# Patient Record
Sex: FEMALE | Race: WHITE | NOT HISPANIC OR LATINO
[De-identification: names, ages, dates, MRNs, and addresses within clinical notes are randomized per-mention and may not be internally consistent; named-entity substitution may affect disease eponyms.]

---

## 2019-04-03 PROBLEM — Z00.00 ENCOUNTER FOR PREVENTIVE HEALTH EXAMINATION: Status: ACTIVE | Noted: 2019-04-03

## 2019-04-30 ENCOUNTER — APPOINTMENT (OUTPATIENT)
Dept: PULMONOLOGY | Facility: CLINIC | Age: 59
End: 2019-04-30
Payer: COMMERCIAL

## 2019-04-30 VITALS
HEART RATE: 77 BPM | BODY MASS INDEX: 20.02 KG/M2 | HEIGHT: 63 IN | DIASTOLIC BLOOD PRESSURE: 60 MMHG | SYSTOLIC BLOOD PRESSURE: 108 MMHG | WEIGHT: 113 LBS | OXYGEN SATURATION: 100 %

## 2019-04-30 DIAGNOSIS — Z86.39 PERSONAL HISTORY OF OTHER ENDOCRINE, NUTRITIONAL AND METABOLIC DISEASE: ICD-10-CM

## 2019-04-30 DIAGNOSIS — J45.909 UNSPECIFIED ASTHMA, UNCOMPLICATED: ICD-10-CM

## 2019-04-30 DIAGNOSIS — Z82.5 FAMILY HISTORY OF ASTHMA AND OTHER CHRONIC LOWER RESPIRATORY DISEASES: ICD-10-CM

## 2019-04-30 PROCEDURE — 99204 OFFICE O/P NEW MOD 45 MIN: CPT | Mod: 25

## 2019-04-30 PROCEDURE — 94664 DEMO&/EVAL PT USE INHALER: CPT | Mod: 59

## 2019-04-30 PROCEDURE — 94729 DIFFUSING CAPACITY: CPT

## 2019-04-30 PROCEDURE — 94727 GAS DIL/WSHOT DETER LNG VOL: CPT

## 2019-04-30 PROCEDURE — 85018 HEMOGLOBIN: CPT | Mod: QW

## 2019-04-30 PROCEDURE — 94060 EVALUATION OF WHEEZING: CPT

## 2019-04-30 RX ORDER — PNV NO.95/FERROUS FUM/FOLIC AC 28MG-0.8MG
100 TABLET ORAL
Refills: 0 | Status: ACTIVE | COMMUNITY

## 2019-04-30 RX ORDER — RIBOFLAVIN (VITAMIN B2) 100 MG
100 TABLET ORAL
Refills: 0 | Status: ACTIVE | COMMUNITY

## 2019-04-30 RX ORDER — MONTELUKAST SODIUM 10 MG/1
10 TABLET, FILM COATED ORAL
Refills: 0 | Status: ACTIVE | COMMUNITY

## 2019-04-30 RX ORDER — MULTIVIT-MIN/FOLIC/VIT K/LYCOP 400-300MCG
25 MCG TABLET ORAL
Refills: 0 | Status: ACTIVE | COMMUNITY

## 2019-04-30 RX ORDER — ATORVASTATIN CALCIUM 10 MG/1
10 TABLET, FILM COATED ORAL
Refills: 0 | Status: ACTIVE | COMMUNITY

## 2019-04-30 RX ORDER — LEVOTHYROXINE SODIUM 0.03 MG/1
25 TABLET ORAL
Refills: 0 | Status: ACTIVE | COMMUNITY

## 2019-04-30 RX ORDER — CALCIUM CARBONATE/VITAMIN D3 500-10/5ML
30 LIQUID (ML) ORAL
Refills: 0 | Status: ACTIVE | COMMUNITY

## 2019-04-30 NOTE — DISCUSSION/SUMMARY
[FreeTextEntry1] : Asthma by hx\par recurrent bronchitis and fatigue \par never smoker, few childhood pneumonias by hx\par denies any rhinitis, GERD, or significant environmental exposure currently\par current exam without bronchospasm, normal sp02\par PFTs are normal\par discussed CT chest to evaluate for bronchiectasis, she refuses currently\par would continue Singulair and prn rescue\par she states routine screening and labs are "ok:\par pt states she will follow up prn

## 2019-04-30 NOTE — PHYSICAL EXAM
[General Appearance - Well Developed] : well developed [General Appearance - Well Nourished] : well nourished [Normal Conjunctiva] : the conjunctiva exhibited no abnormalities [Normal Oropharynx] : normal oropharynx [II] : II [Neck Appearance] : the appearance of the neck was normal [Heart Rate And Rhythm] : heart rate and rhythm were normal [Heart Sounds] : normal S1 and S2 [Murmurs] : no murmurs present [Edema] : no peripheral edema present [Respiration, Rhythm And Depth] : normal respiratory rhythm and effort [Exaggerated Use Of Accessory Muscles For Inspiration] : no accessory muscle use [Auscultation Breath Sounds / Voice Sounds] : lungs were clear to auscultation bilaterally [Lungs Percussion] : the lungs were normal to percussion [Abnormal Walk] : normal gait [Nail Clubbing] : no clubbing of the fingernails [Cyanosis, Localized] : no localized cyanosis [] : no rash [No Focal Deficits] : no focal deficits [Oriented To Time, Place, And Person] : oriented to person, place, and time [Impaired Insight] : insight and judgment were intact [Affect] : the affect was normal [Mood] : the mood was normal [Memory Recent] : recent memory was not impaired [FreeTextEntry1] : no chest wall abn

## 2019-04-30 NOTE — CONSULT LETTER
[Dear  ___] : Dear  [unfilled], [Consult Letter:] : I had the pleasure of evaluating your patient, [unfilled]. [Please see my note below.] : Please see my note below. [Sincerely,] : Sincerely, [FreeTextEntry3] : Garrick Mustafa DO [facility]\par Pulmonary Critical Care\par Director Pulmonary Division\par Medical Director Respiratory Therapy\par [facility]\par \par

## 2019-04-30 NOTE — HISTORY OF PRESENT ILLNESS
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

## 2019-05-14 ENCOUNTER — OUTPATIENT (OUTPATIENT)
Dept: OUTPATIENT SERVICES | Facility: HOSPITAL | Age: 59
LOS: 1 days | End: 2019-05-14
Payer: COMMERCIAL

## 2019-05-14 DIAGNOSIS — R53.83 OTHER FATIGUE: ICD-10-CM

## 2019-05-14 PROCEDURE — 93016 CV STRESS TEST SUPVJ ONLY: CPT

## 2019-05-14 PROCEDURE — 93017 CV STRESS TEST TRACING ONLY: CPT

## 2019-05-14 PROCEDURE — 93018 CV STRESS TEST I&R ONLY: CPT

## 2022-11-10 ENCOUNTER — OFFICE (OUTPATIENT)
Dept: URBAN - METROPOLITAN AREA CLINIC 63 | Facility: CLINIC | Age: 62
Setting detail: OPHTHALMOLOGY
End: 2022-11-10
Payer: MEDICARE

## 2022-11-10 DIAGNOSIS — H43.813: ICD-10-CM

## 2022-11-10 DIAGNOSIS — H52.31: ICD-10-CM

## 2022-11-10 DIAGNOSIS — H52.13: ICD-10-CM

## 2022-11-10 DIAGNOSIS — H25.13: ICD-10-CM

## 2022-11-10 DIAGNOSIS — H35.362: ICD-10-CM

## 2022-11-10 PROCEDURE — 92004 COMPRE OPH EXAM NEW PT 1/>: CPT | Performed by: STUDENT IN AN ORGANIZED HEALTH CARE EDUCATION/TRAINING PROGRAM

## 2022-11-10 PROCEDURE — 92134 CPTRZ OPH DX IMG PST SGM RTA: CPT | Performed by: STUDENT IN AN ORGANIZED HEALTH CARE EDUCATION/TRAINING PROGRAM

## 2022-11-10 ASSESSMENT — TONOMETRY
OS_IOP_MMHG: 14
OD_IOP_MMHG: 15

## 2022-11-10 ASSESSMENT — REFRACTION_AUTOREFRACTION
OS_SPHERE: -3.75
OS_AXIS: 103
OS_CYLINDER: -0.50
OD_AXIS: 020
OD_CYLINDER: -1.50
OD_SPHERE: +0.50

## 2022-11-10 ASSESSMENT — CONFRONTATIONAL VISUAL FIELD TEST (CVF)
OD_FINDINGS: FULL
OS_FINDINGS: FULL

## 2022-11-10 ASSESSMENT — SPHEQUIV_DERIVED
OD_SPHEQUIV: -0.25
OS_SPHEQUIV: -4

## 2022-11-10 ASSESSMENT — AXIALLENGTH_DERIVED
OD_AL: 24.9065
OS_AL: 27.8744

## 2022-11-10 ASSESSMENT — KERATOMETRY
OS_K1POWER_DIOPTERS: 37.75
OD_K1POWER_DIOPTERS: 40.00
OD_AXISANGLE_DEGREES: 104
OS_AXISANGLE_DEGREES: 014
OS_K2POWER_DIOPTERS: 38.00
OD_K2POWER_DIOPTERS: 40.75

## 2022-11-10 ASSESSMENT — VISUAL ACUITY
OD_BCVA: 20/50
OS_BCVA: 20/25

## 2023-01-12 ENCOUNTER — OFFICE (OUTPATIENT)
Dept: URBAN - METROPOLITAN AREA CLINIC 104 | Facility: CLINIC | Age: 63
Setting detail: OPHTHALMOLOGY
End: 2023-01-12
Payer: MEDICARE

## 2023-01-12 DIAGNOSIS — H52.31: ICD-10-CM

## 2023-01-12 DIAGNOSIS — H25.13: ICD-10-CM

## 2023-01-12 DIAGNOSIS — H52.13: ICD-10-CM

## 2023-01-12 DIAGNOSIS — H35.362: ICD-10-CM

## 2023-01-12 DIAGNOSIS — H43.813: ICD-10-CM

## 2023-01-12 DIAGNOSIS — H35.40: ICD-10-CM

## 2023-01-12 PROCEDURE — 92015 DETERMINE REFRACTIVE STATE: CPT | Performed by: OPHTHALMOLOGY

## 2023-01-12 PROCEDURE — 92014 COMPRE OPH EXAM EST PT 1/>: CPT | Performed by: OPHTHALMOLOGY

## 2023-01-12 ASSESSMENT — KERATOMETRY
OD_K2POWER_DIOPTERS: 41.06
OS_CYLAXISANGLE_DEGREES: 9
OS_K2POWER_DIOPTERS: 38.22
OS_CYLPOWER_DEGREES: 0.3
OD_AXISANGLE_DEGREES: 108
OD_CYLPOWER_DEGREES: 0.98
OD_CYLAXISANGLE_DEGREES: 108
OS_AXISANGLE_DEGREES: 9
OS_K1POWER_DIOPTERS: 37.92
OD_K1K2_AVERAGE: 40.57
OS_K1K2_AVERAGE: 38.07
OS_AXISANGLE2_DEGREES: 9
OD_AXISANGLE2_DEGREES: 108
OD_K1POWER_DIOPTERS: 40.08

## 2023-01-12 ASSESSMENT — CONFRONTATIONAL VISUAL FIELD TEST (CVF)
OS_FINDINGS: FULL
OD_FINDINGS: FULL

## 2023-01-12 ASSESSMENT — TONOMETRY
OD_IOP_MMHG: 15
OS_IOP_MMHG: 15

## 2023-01-16 PROBLEM — H17.9 CORNEAL SCAR ; BOTH EYES: Status: ACTIVE | Noted: 2023-01-12

## 2023-01-16 PROBLEM — H35.40 PERIPAPILLARY ATROPHY ; LEFT EYE: Status: ACTIVE | Noted: 2023-01-12

## 2023-01-17 PROBLEM — H25.11 CATARACT SENILE NUCLEAR SCLEROSIS; RIGHT EYE, LEFT EYE, BOTH EYES: Status: ACTIVE | Noted: 2023-01-12

## 2023-01-17 PROBLEM — H25.13 CATARACT SENILE NUCLEAR SCLEROSIS; RIGHT EYE, LEFT EYE, BOTH EYES: Status: ACTIVE | Noted: 2023-01-12

## 2023-01-17 PROBLEM — H25.12 CATARACT SENILE NUCLEAR SCLEROSIS; RIGHT EYE, LEFT EYE, BOTH EYES: Status: ACTIVE | Noted: 2023-01-12

## 2023-01-17 ASSESSMENT — REFRACTION_AUTOREFRACTION
OS_AXIS: 159
OD_AXIS: 60
OD_SPHERE: -0.50
OS_CYLINDER: -0.75
OD_CYLINDER: -0.75
OS_SPHERE: -3.50

## 2023-01-17 ASSESSMENT — REFRACTION_MANIFEST
OD_VA1: 20/25
OD_CYLINDER: -0.75
OS_SPHERE: -3.25
OS_AXIS: 160
OS_VA1: 20/25
OD_AXIS: 60
OS_CYLINDER: -0.75
OD_SPHERE: -0.50

## 2023-01-17 ASSESSMENT — SPHEQUIV_DERIVED
OS_SPHEQUIV: -3.875
OD_SPHEQUIV: -0.875
OD_SPHEQUIV: -0.875
OS_SPHEQUIV: -3.625

## 2023-01-17 ASSESSMENT — KERATOMETRY
OS_K2POWER_DIOPTERS: 38.22
OD_K1POWER_DIOPTERS: 40.08
OS_K1POWER_DIOPTERS: 37.92
OS_AXISANGLE_DEGREES: 9
OD_AXISANGLE_DEGREES: 108
OD_K2POWER_DIOPTERS: 41.06

## 2023-01-17 ASSESSMENT — AXIALLENGTH_DERIVED
OS_AL: 27.71
OD_AL: 25.1
OS_AL: 27.57
OD_AL: 25.1

## 2023-01-17 ASSESSMENT — VISUAL ACUITY
OD_BCVA: 20/50
OS_BCVA: 20/25

## 2023-05-25 ENCOUNTER — OFFICE (OUTPATIENT)
Dept: URBAN - METROPOLITAN AREA CLINIC 104 | Facility: CLINIC | Age: 63
Setting detail: OPHTHALMOLOGY
End: 2023-05-25
Payer: MEDICARE

## 2023-05-25 DIAGNOSIS — H35.40: ICD-10-CM

## 2023-05-25 DIAGNOSIS — H25.11: ICD-10-CM

## 2023-05-25 DIAGNOSIS — H52.31: ICD-10-CM

## 2023-05-25 DIAGNOSIS — H17.9: ICD-10-CM

## 2023-05-25 DIAGNOSIS — H25.12: ICD-10-CM

## 2023-05-25 DIAGNOSIS — H52.13: ICD-10-CM

## 2023-05-25 DIAGNOSIS — H35.362: ICD-10-CM

## 2023-05-25 DIAGNOSIS — H43.813: ICD-10-CM

## 2023-05-25 DIAGNOSIS — H25.13: ICD-10-CM

## 2023-05-25 PROCEDURE — 99213 OFFICE O/P EST LOW 20 MIN: CPT | Performed by: OPHTHALMOLOGY

## 2023-05-25 ASSESSMENT — REFRACTION_MANIFEST
OD_AXIS: 45
OD_VA1: 20/20-1
OD_SPHERE: PLANO
OS_VA1: 20/20-1
OS_SPHERE: -3.50
OD_CYLINDER: -0.50

## 2023-05-25 ASSESSMENT — REFRACTION_AUTOREFRACTION
OS_CYLINDER: -0.50
OS_SPHERE: -3.75
OD_SPHERE: +0.25
OD_CYLINDER: -0.75
OD_AXIS: 044
OS_AXIS: 165

## 2023-05-25 ASSESSMENT — VISUAL ACUITY
OD_BCVA: 20/50
OS_BCVA: 20/25

## 2023-05-25 ASSESSMENT — TONOMETRY
OS_IOP_MMHG: 12
OD_IOP_MMHG: 12

## 2023-05-25 ASSESSMENT — REFRACTION_CURRENTRX
OD_OVR_VA: 20/
OS_OVR_VA: 20/

## 2023-05-25 ASSESSMENT — SPHEQUIV_DERIVED
OS_SPHEQUIV: -4
OD_SPHEQUIV: -0.125

## 2023-05-25 ASSESSMENT — KERATOMETRY
OS_AXISANGLE_DEGREES: 024
OD_K2POWER_DIOPTERS: 40.91
OS_K2POWER_DIOPTERS: 38.18
OS_K1POWER_DIOPTERS: 38.01
OD_AXISANGLE_DEGREES: 104
OD_K1POWER_DIOPTERS: 40.18

## 2023-05-25 ASSESSMENT — AXIALLENGTH_DERIVED
OD_AL: 24.78
OS_AL: 27.76

## 2023-05-25 ASSESSMENT — CONFRONTATIONAL VISUAL FIELD TEST (CVF)
OD_FINDINGS: FULL
OS_FINDINGS: FULL

## 2024-01-25 ENCOUNTER — OFFICE (OUTPATIENT)
Dept: URBAN - METROPOLITAN AREA CLINIC 104 | Facility: CLINIC | Age: 64
Setting detail: OPHTHALMOLOGY
End: 2024-01-25
Payer: MEDICARE

## 2024-01-25 DIAGNOSIS — H25.11: ICD-10-CM

## 2024-01-25 DIAGNOSIS — H17.9: ICD-10-CM

## 2024-01-25 DIAGNOSIS — H52.13: ICD-10-CM

## 2024-01-25 DIAGNOSIS — H35.40: ICD-10-CM

## 2024-01-25 DIAGNOSIS — H25.13: ICD-10-CM

## 2024-01-25 DIAGNOSIS — H35.362: ICD-10-CM

## 2024-01-25 DIAGNOSIS — H43.813: ICD-10-CM

## 2024-01-25 DIAGNOSIS — H52.31: ICD-10-CM

## 2024-01-25 DIAGNOSIS — H25.12: ICD-10-CM

## 2024-01-25 PROBLEM — H35.54 RPE CHANGES: Status: ACTIVE | Noted: 2024-01-25

## 2024-01-25 PROCEDURE — 92134 CPTRZ OPH DX IMG PST SGM RTA: CPT | Performed by: OPHTHALMOLOGY

## 2024-01-25 PROCEDURE — 92014 COMPRE OPH EXAM EST PT 1/>: CPT | Performed by: OPHTHALMOLOGY

## 2024-01-25 ASSESSMENT — REFRACTION_MANIFEST
OD_AXIS: 045
OD_ADD: +2.25
OS_VA1: 20/25-2
OS_ADD: +2.25
OS_SPHERE: -3.50
OD_CYLINDER: -0.75
OS_AXIS: 140
OU_VA: 20/20
OD_VA1: 20/20
OS_CYLINDER: -0.75
OD_SPHERE: -0.25

## 2024-01-25 ASSESSMENT — REFRACTION_AUTOREFRACTION
OD_SPHERE: +0.25
OS_CYLINDER: -1.25
OD_CYLINDER: -1.25
OS_SPHERE: -4.00
OS_AXIS: 139
OD_AXIS: 045

## 2024-01-25 ASSESSMENT — REFRACTION_CURRENTRX
OD_OVR_VA: 20/
OS_OVR_VA: 20/

## 2024-01-25 ASSESSMENT — SPHEQUIV_DERIVED
OS_SPHEQUIV: -3.875
OD_SPHEQUIV: -0.375
OS_SPHEQUIV: -4.625
OD_SPHEQUIV: -0.625

## 2024-01-25 ASSESSMENT — CONFRONTATIONAL VISUAL FIELD TEST (CVF)
OS_FINDINGS: FULL
OD_FINDINGS: FULL

## 2024-06-25 ENCOUNTER — OFFICE (OUTPATIENT)
Dept: URBAN - METROPOLITAN AREA CLINIC 104 | Facility: CLINIC | Age: 64
Setting detail: OPHTHALMOLOGY
End: 2024-06-25
Payer: MEDICARE

## 2024-06-25 DIAGNOSIS — H43.813: ICD-10-CM

## 2024-06-25 DIAGNOSIS — H35.54: ICD-10-CM

## 2024-06-25 DIAGNOSIS — H25.13: ICD-10-CM

## 2024-06-25 DIAGNOSIS — H35.362: ICD-10-CM

## 2024-06-25 PROCEDURE — 92014 COMPRE OPH EXAM EST PT 1/>: CPT | Performed by: OPTOMETRIST

## 2024-06-25 PROCEDURE — 92134 CPTRZ OPH DX IMG PST SGM RTA: CPT | Performed by: OPTOMETRIST

## 2024-06-25 ASSESSMENT — CONFRONTATIONAL VISUAL FIELD TEST (CVF)
OD_FINDINGS: FULL
OS_FINDINGS: FULL